# Patient Record
Sex: FEMALE | Race: WHITE
[De-identification: names, ages, dates, MRNs, and addresses within clinical notes are randomized per-mention and may not be internally consistent; named-entity substitution may affect disease eponyms.]

---

## 2018-01-12 ENCOUNTER — HOSPITAL ENCOUNTER (EMERGENCY)
Dept: HOSPITAL 89 - ER | Age: 28
Discharge: HOME | End: 2018-01-12
Payer: MEDICAID

## 2018-01-12 VITALS — DIASTOLIC BLOOD PRESSURE: 82 MMHG | SYSTOLIC BLOOD PRESSURE: 123 MMHG

## 2018-01-12 DIAGNOSIS — S63.502A: Primary | ICD-10-CM

## 2018-01-12 PROCEDURE — 99283 EMERGENCY DEPT VISIT LOW MDM: CPT

## 2018-01-12 NOTE — RADIOLOGY IMAGING REPORT
FACILITY: [facility] 

 

PATIENT NAME: Amber Tran

: 1990

MR: [id number]

V: [id number]

EXAM DATE: 

ORDERING PHYSICIAN: TABITHA ENCISO

TECHNOLOGIST: 

 

Location: [facility]

Patient: Amber Tran

: 1990

MRN: [id number]

Visit/Account:[id number]

Date of Sevice:  2018

 

ACCESSION #: [id number]

 

EXAMINATION:  Left wrist series, 3 views  2018 10:25 PM

 

HISTORY: Fall. Lateral pain in left wrist.

 

COMPARISON:  None

 

FINDINGS:  Visualized bony structures are intact and anatomically aligned without fracture or other a
cute osseous abnormality evident. Small chronic ossification center or sesamoid dorsally over the car
pometacarpal articulation on the lateral.

 

IMPRESSION:

No acute bony injury.

 

Report Dictated By: Ab Sanchez MD at 2018 10:46 PM

 

Report E-Signed By: Ab Sanchez MD  at 2018 10:48 PM

 

WSN:[id number]

## 2018-06-03 ENCOUNTER — HOSPITAL ENCOUNTER (EMERGENCY)
Dept: HOSPITAL 89 - ER | Age: 28
Discharge: HOME | End: 2018-06-03
Payer: MEDICAID

## 2018-06-03 VITALS — DIASTOLIC BLOOD PRESSURE: 92 MMHG | SYSTOLIC BLOOD PRESSURE: 114 MMHG

## 2018-06-03 DIAGNOSIS — S90.32XA: Primary | ICD-10-CM

## 2018-06-03 PROCEDURE — 99283 EMERGENCY DEPT VISIT LOW MDM: CPT

## 2018-06-03 PROCEDURE — 99282 EMERGENCY DEPT VISIT SF MDM: CPT

## 2018-06-03 NOTE — ER REPORT
History and Physical


Time Seen By MD:  19:36


Hx. of Stated Complaint:  


WATER TROUGH DROPPED ON LEFT FOOT APPROX 150#


HPI/ROS


CHIEF COMPLAINT: Dropped trough on foot





HISTORY OF PRESENT ILLNESS: 27-year-old female patient presents to emergency 

room with complaint of left foot pain. Patient states that she was cleaning out 

the water trough for horse and while she was doing that she did not notice that 

it was caught on the fence. As she is putting water into it a became 

disconnected from the fence and fell onto her foot. Patient since then has been 

having significant amounts of pain to the top side of her foot. She states she 

does have some numbness and tingling to her toes. Patient denies any difficulty 

moving her toes. She states she has been able to walk on it although she does 

have discomfort with that. Patient has not taken any medication for this.


Allergies:  


Coded Allergies:  


     latex (Verified  Allergy, Intermediate, ITCHY, 6/3/18)


     Sulfa (Sulfonamide Antibiotics) (Verified  Allergy, Mild, NAUSEA, AND 

TIGHT CHEST, 6/3/18)


     banana (Verified  Allergy, Unknown, 6/3/18)


     chocolate flavor (Verified  Allergy, Unknown, 6/3/18)


     coffee (Coffea arabica) (Verified  Allergy, Unknown, 6/3/18)


     morphine (Verified  Allergy, Unknown, 6/3/18)


     oxycodone (Verified  Adverse Reaction, Mild, Vomiting , 6/3/18)


Uncoded Allergies:  


     spiders (Allergy, Severe, tachycardia/chest tightness/low blood pressure, 2 /12/17)


Home Meds


Reported Medications


Trazodone Hcl (TRAZODONE HCL) 100 Mg Tablet, 100 MG PO QHS, TAB


   11/23/17


Sertraline Hcl (SERTRALINE HCL) 100 Mg Tablet, 1 TAB PO QDAY, TAB


   11/23/17


Discontinued Reported Medications


Multivitamin (ONE DAILY) 1 Each Tablet, 1 EACH PO DAILY


   10/16/15


Discontinued Scripts


Epinephrine (EPIPEN 2-CRIS) 0.3 Mg/0.3 Ml Pen.injctr, 0.3 MG IM ONCE for 

allergic reaction, #1 PACK 0 Refills


   Prov:LISA YATES MD         11/30/16


Past Medical/Surgical History


Patient has a past medical history of tachycardia, panic attacks, hernia, 

kidney stones, frequent UTIs, ovarian cyst, polycystic ovarian syndrome, back 

pain, PTSD, postpartum depression.


Patient has a surgical history of neck surgery.


Patient has a family medical history of stroke, diabetes, mental disability.


Reviewed Nurses Notes:  Yes


Hx Smoking:  Yes (1/4PPD )


Smoking Status:  Current: Some Days Smoker


Exposure to Second Hand Smoke?:  Yes (1/2 pack a day)


Hx Substance Use Disorder:  No


Hx Alcohol Use:  No


Constitutional





Vital Sign - Last 24 Hours








 6/3/18 6/3/18





 19:27 20:58


 


Temp 98.7 


 


Pulse 91 80


 


Resp 18 16


 


B/P (MAP) 120/82 114/92 (99)


 


Pulse Ox 95 90


 


O2 Delivery Room Air 








Physical Exam


   General appearance: Alert no distress.


Respiratory: Chest is non tender, lungs are clear to auscultation.


Cardiac: Regular rate and rhythm.


Musculoskeletal: Patient has tenderness to the top of the left foot, there is 

no bruising noted, patient is able wiggle her toes without any difficulties.





DIFFERENTIAL DIAGNOSIS: After history and physical exam differential diagnosis 

was considered for contusion, fracture, crush injury.





Medical Decision Making


EKG/Imaging


Imaging


INDICATION:  Left foot injury.


 


EXAM DATE:  6/3/2018 8:04 PM


 


COMPARISON: None.


 


FINDINGS:


3 views left foot. Mineralization is normal. No acute alignment abnormality or 

fracture. Soft tissues are unremarkable.


 


IMPRESSION:  Normal left foot.


 


Report Dictated By: Stalin He MD at 6/3/2018 8:16 PM


 


Report E-Signed By: Stalin He MD  at 6/3/2018 8:18 PM





ED Course/Re-evaluation


ED Course


Patient was admitted to exam room, history and physical were obtained. 

Differential diagnoses were considered. On examination patient has some 

tenderness to the top of the left foot, there is some swelling but no bruising. 

X-rays done of the left foot which was negative for fracture. I discussed 

findings with the patient. We will go ahead and discharge patient this time. 

She is to limit her activity by pain, she is to ice her foot to 3 times a day. 

She is return to emergency room if condition worsens. Patient verbalized 

understanding and agreement with plan.


Decision to Disposition Date:  Em[place] 3, 2018


Decision to Disposition Time:  20:39





Depart


Departure


Latest Vital Signs





Vital Signs








  Date Time  Temp Pulse Resp B/P (MAP) Pulse Ox O2 Delivery O2 Flow Rate FiO2


 


6/3/18 20:58  80 16 114/92 (99) 90   


 


6/3/18 19:27 98.7     Room Air  








Impression:  


 Primary Impression:  


 Foot contusion


Condition:  Improved


Disposition:  HOME OR SELF-CARE


Patient Instructions:  Contusion in Adults (ED)





Additional Instructions:  


Limit activity by pain.


Ice the foot 2-3 times a day for 10-15 minutes.


Get plenty of rest.


Take Tylenol or Ibuprofen as needed for pain.


Follow up with your primary care primary care provider in the next week.


Return to the ER if condition worsens.





Problem Qualifiers








 Primary Impression:  


 Foot contusion


 Encounter type:  initial encounter  Laterality:  left  Qualified Codes:  

S90.32XA - Contusion of left foot, initial encounter








CHUCK COHEN [place] 3, 2018 20:06

## 2018-06-03 NOTE — ER REPORT
History and Physical


Time Seen By MD:  19:27


Allergies:  


Coded Allergies:  


     latex (Verified  Allergy, Intermediate, ITCHY, 6/3/18)


     Sulfa (Sulfonamide Antibiotics) (Verified  Allergy, Mild, NAUSEA, AND 

TIGHT CHEST, 6/3/18)


     banana (Verified  Allergy, Unknown, 6/3/18)


     chocolate flavor (Verified  Allergy, Unknown, 6/3/18)


     coffee (Coffea arabica) (Verified  Allergy, Unknown, 6/3/18)


     morphine (Verified  Allergy, Unknown, 6/3/18)


     oxycodone (Verified  Adverse Reaction, Mild, Vomiting , 6/3/18)


Uncoded Allergies:  


     spiders (Allergy, Severe, tachycardia/chest tightness/low blood pressure, 2 /12/17)


Home Meds


Reported Medications


Trazodone Hcl (TRAZODONE HCL) 100 Mg Tablet, 100 MG PO QHS, TAB


   11/23/17


Sertraline Hcl (SERTRALINE HCL) 100 Mg Tablet, 1 TAB PO QDAY, TAB


   11/23/17


Discontinued Reported Medications


Multivitamin (ONE DAILY) 1 Each Tablet, 1 EACH PO DAILY


   10/16/15


Discontinued Scripts


Epinephrine (EPIPEN 2-CRIS) 0.3 Mg/0.3 Ml Pen.injctr, 0.3 MG IM ONCE for 

allergic reaction, #1 PACK 0 Refills


   Prov:LISA AYTES MD         11/30/16


Reviewed Nurses Notes:  Yes


Old Medical Records Reviewed:  Yes


Hx Smoking:  Yes (1/4PPD )


Smoking Status:  Current: Some Days Smoker


Exposure to Second Hand Smoke?:  Yes (1/2 pack a day)


Hx Substance Use Disorder:  No


Hx Alcohol Use:  No


Constitutional





Vital Sign - Last 24 Hours








 6/3/18





 19:27


 


Temp 98.7


 


Pulse 91


 


Resp 18


 


B/P (MAP) 120/82


 


Pulse Ox 95


 


O2 Delivery Room Air











Depart


Departure


Latest Vital Signs





Vital Signs








  Date Time  Temp Pulse Resp B/P (MAP) Pulse Ox O2 Delivery O2 Flow Rate FiO2


 


6/3/18 19:27 98.7 91 18 120/82 95 Room Air  

















TABITHA ENCISO DO [place] 3, 2018 19:27

## 2018-06-03 NOTE — RADIOLOGY IMAGING REPORT
[place] [facility] [place] 

 

PATIENT NAME: Amber Tran

: 1990

MR: [id number]

V: [id number]

EXAM DATE: 

ORDERING PHYSICIAN: CHUCK COHEN

TECHNOLOGIST: 

 

Location: [facility]

Patient: Amber Tran

: 1990

MRN: [id number]

Visit/Account:[id number]

Date of Sevice:  2018

 

ACCESSION #: [id number]

 

INDICATION:  Left foot injury.

 

EXAM DATE:  6/3/2018 8:04 PM

 

COMPARISON: None.

 

FINDINGS:

3 views left foot. Mineralization is normal. No acute alignment abnormality or fracture. Soft tissues
 are unremarkable.

 

IMPRESSION:  Normal left foot.

 

Report Dictated By: Stalin He MD at 6/3/2018 8:16 PM

 

Report E-Signed By: Stalin He MD  at 6/3/2018 8:18 PM

 

WSN:[id number]

## 2018-07-19 ENCOUNTER — HOSPITAL ENCOUNTER (EMERGENCY)
Dept: HOSPITAL 89 - ER | Age: 28
Discharge: HOME | End: 2018-07-19
Payer: MEDICAID

## 2018-07-19 VITALS — SYSTOLIC BLOOD PRESSURE: 101 MMHG | DIASTOLIC BLOOD PRESSURE: 58 MMHG

## 2018-07-19 DIAGNOSIS — N10: Primary | ICD-10-CM

## 2018-07-19 LAB — PLATELET COUNT, AUTOMATED: 195 K/UL (ref 150–450)

## 2018-07-19 PROCEDURE — 74177 CT ABD & PELVIS W/CONTRAST: CPT

## 2018-07-19 PROCEDURE — 82247 BILIRUBIN TOTAL: CPT

## 2018-07-19 PROCEDURE — 82374 ASSAY BLOOD CARBON DIOXIDE: CPT

## 2018-07-19 PROCEDURE — 84520 ASSAY OF UREA NITROGEN: CPT

## 2018-07-19 PROCEDURE — 96374 THER/PROPH/DIAG INJ IV PUSH: CPT

## 2018-07-19 PROCEDURE — 82310 ASSAY OF CALCIUM: CPT

## 2018-07-19 PROCEDURE — 99284 EMERGENCY DEPT VISIT MOD MDM: CPT

## 2018-07-19 PROCEDURE — 82040 ASSAY OF SERUM ALBUMIN: CPT

## 2018-07-19 PROCEDURE — 84155 ASSAY OF PROTEIN SERUM: CPT

## 2018-07-19 PROCEDURE — 82435 ASSAY OF BLOOD CHLORIDE: CPT

## 2018-07-19 PROCEDURE — 83690 ASSAY OF LIPASE: CPT

## 2018-07-19 PROCEDURE — 84460 ALANINE AMINO (ALT) (SGPT): CPT

## 2018-07-19 PROCEDURE — 84295 ASSAY OF SERUM SODIUM: CPT

## 2018-07-19 PROCEDURE — 84450 TRANSFERASE (AST) (SGOT): CPT

## 2018-07-19 PROCEDURE — 85025 COMPLETE CBC W/AUTO DIFF WBC: CPT

## 2018-07-19 PROCEDURE — 82947 ASSAY GLUCOSE BLOOD QUANT: CPT

## 2018-07-19 PROCEDURE — 96361 HYDRATE IV INFUSION ADD-ON: CPT

## 2018-07-19 PROCEDURE — 84075 ASSAY ALKALINE PHOSPHATASE: CPT

## 2018-07-19 PROCEDURE — 96375 TX/PRO/DX INJ NEW DRUG ADDON: CPT

## 2018-07-19 PROCEDURE — 84132 ASSAY OF SERUM POTASSIUM: CPT

## 2018-07-19 PROCEDURE — 84703 CHORIONIC GONADOTROPIN ASSAY: CPT

## 2018-07-19 PROCEDURE — 82565 ASSAY OF CREATININE: CPT

## 2018-07-19 PROCEDURE — 81001 URINALYSIS AUTO W/SCOPE: CPT

## 2018-07-19 NOTE — ER REPORT
History and Physical


Time Seen By MD:  08:00


HPI/ROS


CHIEF COMPLAINT: Right lower quadrant abdominal pain





HISTORY OF PRESENT ILLNESS: Patient is a 27-year-old female who presents to the 

emergency department with complaint of right lower quadrant abdominal pain that 

is severe in nature. Movement seems to make the pain worse. She states that the 

symptoms began 3 AM yesterday. She apparently was seen by her primary care 

provider yesterday. She states that the provider was not concerned somewhat 

about abdominal pain however stated that her lungs sounded "bad". She reports 

that the chest x-ray was done at that time but sates that it was "negative". 

She was started on a course of dicloxacillin for presumed pneumonia. Patient 

states she's been having fevers and chills. She states that the pain has 

persisted which is why she presents to the emergency department at this time. 

She denies any dysuria. She denies any vaginal discharge. She does state that 

she's had a history of ovarian cysts in the past.





REVIEW OF SYSTEMS:


Constitutional: Fevers with chills


Eyes: No discharge.


ENT: No sore throat. 


Cardiovascular: No chest pain, no palpitations.


Respiratory: No cough, no shortness of breath.


Gastrointestinal: Right lower quadrant abdominal pain, nausea


Genitourinary: No hematuria.


Musculoskeletal: No back pain.


Skin: No rashes.


Neurological: No headache.


Allergies:  


Coded Allergies:  


     latex (Verified  Allergy, Intermediate, ITCHY, 18)


     Sulfa (Sulfonamide Antibiotics) (Verified  Allergy, Mild, NAUSEA, AND 

TIGHT CHEST, 18)


     banana (Verified  Allergy, Unknown, 18)


     chocolate flavor (Verified  Allergy, Unknown, 18)


     coffee (Coffea arabica) (Verified  Allergy, Unknown, 18)


     morphine (Verified  Allergy, Unknown, 18)


     oxycodone (Verified  Adverse Reaction, Mild, Vomiting , 18)


Uncoded Allergies:  


     spiders (Allergy, Severe, tachycardia/chest tightness/low blood pressure, )


Home Meds


Active Scripts


Hydrocodone Bit/Acetaminophen (HYDROCODON-ACETAMINOPHEN 5-325) 1 Each Tablet, 1 

EACH PO Q6H for PAIN, #15 TAB 0 Refills


   Prov:LISA YATES MD         18


Cephalexin 500 Mg Tab (KEFLEX 500 MG TAB) 500 Mg Tablet, 500 MG PO Q6H, #28 TAB 

0 Refills


   TAKE ONE TABLET BY MOUTH EVERY SIX HOURS


   Prov:LISA YATES MD         18


Ondansetron Hcl (ZOFRAN) 4 Mg Tablet, 4 MG PO Q8H for Nausea, #15 TAB 0 Refills


   Prov:LISA YATES MD         18


Reported Medications


[implanon]   No Conflict Check


   18


Dicloxacillin Sodium (DICLOXACILLIN SODIUM) 250 Mg Capsule, 250 MG PO, CAPSULE


   18


Trazodone Hcl (TRAZODONE HCL) 100 Mg Tablet, 100 MG PO QHS, TAB


   17


Sertraline Hcl (SERTRALINE HCL) 100 Mg Tablet, 1 TAB PO QDAY, TAB


   17


Discontinued Scripts


Oxycodone Hcl/Acetaminophen (PERCOCET 5-325 MG TABLET) 1 Each Tablet, 1 EACH PO 

Q4-6H for PAIN, #15 TAB 0 Refills


   Prov:LISA YATES MD         18


Past Medical/Surgical History


Patient has a past medical history of tachycardia, panic attacks, hernia, 

kidney stones, frequent UTIs, ovarian cyst, polycystic ovarian syndrome, back 

pain, PTSD, postpartum depression.


Patient has a surgical history of neck surgery.


Hx Smoking:  Yes (1/4PPD )


Smoking Status:  Current: Some Days Smoker


Exposure to Second Hand Smoke?:  Yes (1/2 pack a day)


Hx Substance Use Disorder:  No


Hx Alcohol Use:  No


Constitutional





Vital Sign - Last 24 Hours








 18





 07:57 08:00 08:01 08:15


 


Temp  98.3  


 


Pulse ??? 112  


 


Resp  22  


 


B/P (MAP)  128/76 128/76 (93) 130/86 (101)


 


Pulse Ox  92  


 


O2 Delivery  Room Air  


 


    





 18





 08:27 08:30 08:45 09:15


 


Pulse 110   


 


B/P (MAP)  132/81 (98) 120/85 (97) 128/74 (92)


 


Pulse Ox 94   





 18  





 09:30 09:45  


 


B/P (MAP) 128/74 (92) 101/58 (72)  














Intake and Output   


 


 18





 14:59 22:59 06:59


 


Intake Total 1000 ml  


 


Balance 1000 ml  








Physical Exam


General/Constitutional: Patient is awake, alert, nontoxic and in no acute 

respiratory distress. 


Head: Normocephalic and atraumatic.


Eyes: Conjunctival clear, Pupils are equal and reactive to light. Extraocular 

muscles are intact and symmetrical. Sclera are clear and anicteric.


Ears:External canals are clear. Tympanic membranes are clear with normal 

landmarks and light reflex.


Nares: No rhinorrhea or bleeding. Turbinates are pink and moist.


Oropharyngeal: Mucous membranes are moist. There is no pharyngeal erythema or 

exudate. There are no palatal petechiae. Uvula is midline and symmetrical. 


Neck: Supple, no adenopathy.


Cardiovascular: Heart is regular and slightly tachycardic without murmurs


Pulmonary: Lungs are clear to auscultation bilaterally. There are no wheezes, 

rales, or rhonchi. Chest rise is symmetrical


Abdomen: Right lower quadrant abdominal pain with voluntary guarding equivocal 

rebound tenderness.


Extremities: No gross deformities, No peripheral cyanosis. Able to move all 4 

extremities.


Neuro: Alert and oriented X3,


Skin: No rashes, skin is warm dry and well perfused.





Medical Decision Making


Data Points


Result Diagram:  


18 0806                                                                   

             18 0806





Laboratory





Hematology








Test


  18


08:00 18


08:06


 


Urine Color Yellow  


 


Urine Clarity


  Slightly-cloudy


  


 


 


Urine pH


  5.0 pH


(4.8-9.5) 


 


 


Urine Specific Gravity 1.016  


 


Urine Protein


  30 mg/dL


(NEGATIVE) 


 


 


Urine Glucose (UA)


  Negative mg/dL


(NEGATIVE) 


 


 


Urine Ketones


  Negative mg/dL


(NEGATIVE) 


 


 


Urine Blood


  Moderate


(NEGATIVE) 


 


 


Urine Nitrite


  Negative


(NEGATIVE) 


 


 


Urine Bilirubin


  Negative


(NEGATIVE) 


 


 


Urine Urobilinogen


  Negative mg/dL


(0.2-1.9) 


 


 


Urine Leukocyte Esterase


  Small


(NEGATIVE) 


 


 


Urine RBC


  63 /HPF


(0-2/HPF) 


 


 


Urine WBC


  71 /HPF


(0-5/HPF) 


 


 


Urine Squamous Epithelial


Cells Many /LPF


(</=FEW) 


 


 


Urine Bacteria


  Few /HPF


(NONE-FEW) 


 


 


Urine Mucus


  Few /HPF


(NONE-FEW) 


 


 


Red Blood Count


  


  4.79 M/uL


(4.17-5.56)


 


Mean Corpuscular Volume


  


  87.3 fL


(80.0-96.0)


 


Mean Corpuscular Hemoglobin


  


  30.8 pg


(26.0-33.0)


 


Mean Corpuscular Hemoglobin


Concent 


  35.2 g/dL


(32.0-36.0)


 


Red Cell Distribution Width


  


  13.5 %


(11.5-14.5)


 


Mean Platelet Volume


  


  8.1 fL


(7.2-11.1)


 


Neutrophils (%) (Auto)


  


  79.1 %


(39.4-72.5)


 


Lymphocytes (%) (Auto)


  


  11.5 %


(17.6-49.6)


 


Monocytes (%) (Auto)


  


  8.6 %


(4.1-12.4)


 


Eosinophils (%) (Auto)


  


  0.3 %


(0.4-6.7)


 


Basophils (%) (Auto)


  


  0.5 %


(0.3-1.4)


 


Nucleated RBC Relative Count


(auto) 


  0.0 /100WBC 


 


 


Neutrophils # (Auto)


  


  9.6 K/uL


(2.0-7.4)


 


Lymphocytes # (Auto)


  


  1.4 K/uL


(1.3-3.6)


 


Monocytes # (Auto)


  


  1.0 K/uL


(0.3-1.0)


 


Eosinophils # (Auto)


  


  0.0 K/uL


(0.0-0.5)


 


Basophils # (Auto)


  


  0.1 K/uL


(0.0-0.1)


 


Nucleated RBC Absolute Count


(auto) 


  0.00 K/uL 


 


 


Sodium Level


  


  138 mmol/L


(137-145)


 


Potassium Level


  


  3.5 mmol/L


(3.5-5.0)


 


Chloride Level


  


  105 mmol/L


()


 


Carbon Dioxide Level


  


  22 mmol/L


(22-31)


 


Blood Urea Nitrogen  7 mg/dl (7-18) 


 


Creatinine


  


  0.70 mg/dl


(0.52-1.04)


 


Glomerular Filtration Rate


Calc 


  > 60.0 


 


 


Random Glucose


  


  130 mg/dl


()


 


Calcium Level


  


  8.8 mg/dl


(8.4-10.2)


 


Total Bilirubin


  


  0.6 mg/dl


(0.2-1.3)


 


Aspartate Amino Transf


(AST/SGOT) 


  17 U/L (0-35) 


 


 


Alanine Aminotransferase


(ALT/SGPT) 


  19 U/L (0-56) 


 


 


Alkaline Phosphatase  61 U/L (0-126) 


 


Total Protein


  


  6.6 g/dl


(6.3-8.2)


 


Albumin


  


  3.9 g/dl


(3.5-5.0)


 


Lipase


  


  43 U/L


()


 


Human Chorionic Gonadotropin,


Qual 


  Negative


(NEGATIVE)








Chemistry








Test


  18


08:00 18


08:06


 


Urine Color Yellow  


 


Urine Clarity


  Slightly-cloudy


  


 


 


Urine pH


  5.0 pH


(4.8-9.5) 


 


 


Urine Specific Gravity 1.016  


 


Urine Protein


  30 mg/dL


(NEGATIVE) 


 


 


Urine Glucose (UA)


  Negative mg/dL


(NEGATIVE) 


 


 


Urine Ketones


  Negative mg/dL


(NEGATIVE) 


 


 


Urine Blood


  Moderate


(NEGATIVE) 


 


 


Urine Nitrite


  Negative


(NEGATIVE) 


 


 


Urine Bilirubin


  Negative


(NEGATIVE) 


 


 


Urine Urobilinogen


  Negative mg/dL


(0.2-1.9) 


 


 


Urine Leukocyte Esterase


  Small


(NEGATIVE) 


 


 


Urine RBC


  63 /HPF


(0-2/HPF) 


 


 


Urine WBC


  71 /HPF


(0-5/HPF) 


 


 


Urine Squamous Epithelial


Cells Many /LPF


(</=FEW) 


 


 


Urine Bacteria


  Few /HPF


(NONE-FEW) 


 


 


Urine Mucus


  Few /HPF


(NONE-FEW) 


 


 


White Blood Count


  


  12.1 k/uL


(4.5-11.0)


 


Red Blood Count


  


  4.79 M/uL


(4.17-5.56)


 


Hemoglobin


  


  14.8 g/dL


(12.0-16.0)


 


Hematocrit


  


  41.9 %


(34.0-47.0)


 


Mean Corpuscular Volume


  


  87.3 fL


(80.0-96.0)


 


Mean Corpuscular Hemoglobin


  


  30.8 pg


(26.0-33.0)


 


Mean Corpuscular Hemoglobin


Concent 


  35.2 g/dL


(32.0-36.0)


 


Red Cell Distribution Width


  


  13.5 %


(11.5-14.5)


 


Platelet Count


  


  195 K/uL


(150-450)


 


Mean Platelet Volume


  


  8.1 fL


(7.2-11.1)


 


Neutrophils (%) (Auto)


  


  79.1 %


(39.4-72.5)


 


Lymphocytes (%) (Auto)


  


  11.5 %


(17.6-49.6)


 


Monocytes (%) (Auto)


  


  8.6 %


(4.1-12.4)


 


Eosinophils (%) (Auto)


  


  0.3 %


(0.4-6.7)


 


Basophils (%) (Auto)


  


  0.5 %


(0.3-1.4)


 


Nucleated RBC Relative Count


(auto) 


  0.0 /100WBC 


 


 


Neutrophils # (Auto)


  


  9.6 K/uL


(2.0-7.4)


 


Lymphocytes # (Auto)


  


  1.4 K/uL


(1.3-3.6)


 


Monocytes # (Auto)


  


  1.0 K/uL


(0.3-1.0)


 


Eosinophils # (Auto)


  


  0.0 K/uL


(0.0-0.5)


 


Basophils # (Auto)


  


  0.1 K/uL


(0.0-0.1)


 


Nucleated RBC Absolute Count


(auto) 


  0.00 K/uL 


 


 


Glomerular Filtration Rate


Calc 


  > 60.0 


 


 


Calcium Level


  


  8.8 mg/dl


(8.4-10.2)


 


Total Bilirubin


  


  0.6 mg/dl


(0.2-1.3)


 


Aspartate Amino Transf


(AST/SGOT) 


  17 U/L (0-35) 


 


 


Alanine Aminotransferase


(ALT/SGPT) 


  19 U/L (0-56) 


 


 


Alkaline Phosphatase  61 U/L (0-126) 


 


Total Protein


  


  6.6 g/dl


(6.3-8.2)


 


Albumin


  


  3.9 g/dl


(3.5-5.0)


 


Lipase


  


  43 U/L


()


 


Human Chorionic Gonadotropin,


Qual 


  Negative


(NEGATIVE)








Urinalysis








Test


  18


08:00


 


Urine Color Yellow 


 


Urine Clarity


  Slightly-cloudy


 


 


Urine pH


  5.0 pH


(4.8-9.5)


 


Urine Specific Gravity 1.016 


 


Urine Protein


  30 mg/dL


(NEGATIVE)


 


Urine Glucose (UA)


  Negative mg/dL


(NEGATIVE)


 


Urine Ketones


  Negative mg/dL


(NEGATIVE)


 


Urine Blood


  Moderate


(NEGATIVE)


 


Urine Nitrite


  Negative


(NEGATIVE)


 


Urine Bilirubin


  Negative


(NEGATIVE)


 


Urine Urobilinogen


  Negative mg/dL


(0.2-1.9)


 


Urine Leukocyte Esterase


  Small


(NEGATIVE)


 


Urine RBC


  63 /HPF


(0-2/HPF)


 


Urine WBC


  71 /HPF


(0-5/HPF)


 


Urine Squamous Epithelial


Cells Many /LPF


(</=FEW)


 


Urine Bacteria


  Few /HPF


(NONE-FEW)


 


Urine Mucus


  Few /HPF


(NONE-FEW)











EKG/Imaging


Imaging


FACILITY: [facility] [place] 


 


PATIENT NAME: Amber Tran


: 1990


MR: [id number]


V: [id number]


EXAM DATE: 


ORDERING PHYSICIAN: LISA YATES


TECHNOLOGIST: 


 


Location: [facility] [place]


Patient: Amber Tran


: 1990


MRN: [id number]


Visit/Account:[id number]


Date of Sevice:  2018


 


ACCESSION #: [id number]


 


ABDOMEN/PELVIS WITH CONTRAST


 


HISTORY:  Right lower quadrant pain.


 


TECHNIQUE:  CT abdomen and pelvis with intravenous contrast.


 


One of the following dose optimization techniques was utilized in the 

performance of this exam: Automated exposure control; adjustment of the mA and/

or kV according to the patient's size; or use of an iterative  reconstruction 

technique.  Specific details can be referenced in the facility's radiology CT 

exam operational policy.


 


CONTRAST:  75 mL Isovue-370 IV


 


COMPARISON:  None.


 


FINDINGS:


 


Visualized lung bases:  Negative.


 


Hepatobiliary:  Punctate calcification left hepatic lobe consistent with benign 

granuloma.


 


Spleen:  Negative.


 


Adrenals:  Negative.


 


Pancreas:  Negative.


 


Kidneys/:  Left kidney unremarkable. Patchy ill-defined areas of cortical 

hypoenhancement throughout the right kidney with mild perinephric fat 

stranding. No gross organized cortical fluid collection to suggest abscess at 

this time. Mild asymmetric urothelial thickening on the right without 

hydronephrosis. No urinary collecting system stone. 3.5 cm simple fluid 

attenuating right ovarian cyst.


 


GI:  No obstruction, wall thickening or surrounding inflammation. Appendix well-

visualized and appears normal. Mild distal colonic constipation.


 


Vessels/spaces/nodes:  Trace free pelvic fluid.


 


Bones/soft tissues:  Unremarkable.


 


IMPRESSION:


 


1. Asymmetric right perinephric fat stranding, patchy ill-defined foci of renal 

cortical hypoenhancement and urothelial thickening most consistent with urinary 

tract infection/pyelonephritis.


2. 3.5 cm simple fluid attenuating right ovarian cyst.


 


Report Dictated By: Joel Palumbo MD at 2018 9:17 AM


 


Report E-Signed By: Joel Palumbo MD  at 2018 9:23 AM


 


WSN:[id number]





ED Course/Re-evaluation


Clinical Indication for ER IV:  Hydration, IV Access


ED Course


 2018 8:17:47 am patient with right lower quadrant abdominal pain. After 

history and physical exam was performed differential diagnosis was formulated 

which includes but is not limited to: Appendicitis, gastroenteritis, ovarian 

cysts, ovarian torsion, mesenteric adenitis. Plan at this time will be IV 

hydration along with pain relief with IV Toradol, nausea relief with IV Reglan 

and Benadryl. I will check CBC CMP and CT scan of the abdomen and pelvis. We'll 

also perform a urinalysis. We'll check pregnancy test.


Re-evaluation


 2018 9:23:38 am pain is significantly improved with Toradol, Reglan and 

Benadryl. Awaiting results of CT scan.





 2018 10:16:59 am CT consistent with acute pyelonephritis. Plan will be 

IV Rocephin we'll then send the patient home on oral Keflex and have her 

discontinue the dicloxacillin. I initially called and oxycodone however patient 

has an allergy for that medication we will switch prescription for hydrocodone


Decision to Disposition Date:  2018


Decision to Disposition Time:  10:17





Depart


Departure


Latest Vital Signs





Vital Signs








  Date Time  Temp Pulse Resp B/P (MAP) Pulse Ox O2 Delivery O2 Flow Rate FiO2


 


18 09:45    101/58 (72)    


 


18 08:27  110   94   


 


18 08:00 98.3  22   Room Air  








Impression:  


 Primary Impression:  


 Pyelonephritis


Condition:  Improved


New Scripts


Hydrocodone Bit/Acetaminophen (HYDROCODON-ACETAMINOPHEN 5-325) 1 Each Tablet


1 EACH PO Q6H for PAIN, #15 TAB 0 Refills


   Prov: LISA YATES MD         18 


Cephalexin 500 Mg Tab (KEFLEX 500 MG TAB) 500 Mg Tablet


500 MG PO Q6H, #28 TAB 0 Refills


   TAKE ONE TABLET BY MOUTH EVERY SIX HOURS


   Prov: LISA YATES MD         18 


Ondansetron Hcl (ZOFRAN) 4 Mg Tablet


4 MG PO Q8H for Nausea, #15 TAB 0 Refills


   Prov: LISA YATES MD         18


Patient Instructions:  Urinary Tract Infection in Women (ED)





Additional Instructions:  


Discontinue dicloxacillin, begin new course of antibiotic today and take as 

directed until complete. If your symptoms worsen at any time he should return 

to the emergency department for reevaluation. If your symptoms persist greater 

than 4872 hours he should be reevaluated.











LISA YATES MD 2018 08:00

## 2018-07-19 NOTE — RADIOLOGY IMAGING REPORT
FACILITY: [facility] 

 

PATIENT NAME: Amber Tran

: 1990

MR: [id number]

V: [id number]

EXAM DATE: 

ORDERING PHYSICIAN: LISA YATES

TECHNOLOGIST: 

 

Location: [facility] [place]

Patient: Amber Tran

: 1990

MRN: [id number]

Visit/Account:[id number]

Date of Sevice:  2018

 

ACCESSION #: [id number]

 

ABDOMEN/PELVIS WITH CONTRAST

 

HISTORY:  Right lower quadrant pain.

 

TECHNIQUE:  CT abdomen and pelvis with intravenous contrast.

 

One of the following dose optimization techniques was utilized in the performance of this exam: Autom
ated exposure control; adjustment of the mA and/or kV according to the patient's size; or use of an i
terative  reconstruction technique.  Specific details can be referenced in the facility's radiology C
T exam operational policy.

 

CONTRAST:  75 mL Isovue-370 IV

 

COMPARISON:  None.

 

FINDINGS:

 

Visualized lung bases:  Negative.

 

Hepatobiliary:  Punctate calcification left hepatic lobe consistent with benign granuloma.

 

Spleen:  Negative.

 

Adrenals:  Negative.

 

Pancreas:  Negative.

 

Kidneys/:  Left kidney unremarkable. Patchy ill-defined areas of cortical hypoenhancement throughou
t the right kidney with mild perinephric fat stranding. No gross organized cortical fluid collection 
to suggest abscess at this time. Mild asymmetric urothelial thickening on the right without hydroneph
rosis. No urinary collecting system stone. 3.5 cm simple fluid attenuating right ovarian cyst.

 

GI:  No obstruction, wall thickening or surrounding inflammation. Appendix well-visualized and appear
s normal. Mild distal colonic constipation.

 

Vessels/spaces/nodes:  Trace free pelvic fluid.

 

Bones/soft tissues:  Unremarkable.

 

IMPRESSION:

 

1. Asymmetric right perinephric fat stranding, patchy ill-defined foci of renal cortical hypoenhancem
ent and urothelial thickening most consistent with urinary tract infection/pyelonephritis.

2. 3.5 cm simple fluid attenuating right ovarian cyst.

 

Report Dictated By: Joel Palumbo MD at 2018 9:17 AM

 

Report E-Signed By: Joel Palumbo MD  at 2018 9:23 AM

 

WSN:[id number]

## 2018-08-22 ENCOUNTER — HOSPITAL ENCOUNTER (EMERGENCY)
Dept: HOSPITAL 89 - ER | Age: 28
Discharge: HOME | End: 2018-08-22
Payer: MEDICAID

## 2018-08-22 VITALS — DIASTOLIC BLOOD PRESSURE: 82 MMHG | SYSTOLIC BLOOD PRESSURE: 125 MMHG

## 2018-08-22 DIAGNOSIS — S61.210A: Primary | ICD-10-CM

## 2018-08-22 PROCEDURE — 99283 EMERGENCY DEPT VISIT LOW MDM: CPT

## 2018-08-22 NOTE — ER REPORT
History and Physical


Time Seen By MD:  19:50


HPI/ROS


CHIEF COMPLAINT: Right index finger laceration





HISTORY OF PRESENT ILLNESS: 27-year-old female presents ambulatory the ER with a

laceration to her right index finger.  She was cutting cat stems off to place 

them in water.  She accidentally cut her right index finger adjacent to the nail

on the radial side.  There is approximately 5 mm laceration starting at the 

margin of the nail extending around to the pad of the finger.  There is a small 

nick in the nail.  Patient states her tetanus shots up-to-date.  Patient notes 

2/10 dull pain in her fingertip.  Bleeding controlled with pressure.


Allergies:  


Coded Allergies:  


     latex (Verified  Allergy, Intermediate, ITCHY, 8/22/18)


     Sulfa (Sulfonamide Antibiotics) (Verified  Allergy, Mild, NAUSEA, AND TIGHT

CHEST, 8/22/18)


     banana (Verified  Allergy, Unknown, 8/22/18)


     chocolate flavor (Verified  Allergy, Unknown, 8/22/18)


     coffee (Coffea arabica) (Verified  Allergy, Unknown, 8/22/18)


     morphine (Verified  Allergy, Unknown, 8/22/18)


     oxycodone (Verified  Adverse Reaction, Mild, Vomiting , 8/22/18)


Uncoded Allergies:  


     spiders (Allergy, Severe, tachycardia/chest tightness/low blood pressure, 

2/12/17)


Home Meds


Reported Medications


Fluticasone Prop 44 Mcg (FLOVENT HFA 44 MCG) 44 Mcg Inha, 44 MCG INH, INH


   8/22/18


Epinephrine (EPIPEN 2-CRIS) 0.3 Mg/0.3 Ml Pen.injctr, 0.3 MG IM PRN


   8/22/18


[implanon]   No Conflict Check


   7/19/18


Trazodone Hcl (TRAZODONE HCL) 100 Mg Tablet, 100 MG PO QHS, TAB


   11/23/17


Sertraline Hcl (SERTRALINE HCL) 100 Mg Tablet, 1 TAB PO QDAY, TAB


   11/23/17


Discontinued Reported Medications


Dicloxacillin Sodium (DICLOXACILLIN SODIUM) 250 Mg Capsule, 250 MG PO, CAPSULE


   7/19/18


Discontinued Scripts


Hydrocodone Bit/Acetaminophen (HYDROCODON-ACETAMINOPHEN 5-325) 1 Each Tablet, 1 

EACH PO Q6H for PAIN, #15 TAB 0 Refills


   Prov:LISA YATES MD         7/19/18


Cephalexin 500 Mg Tab (KEFLEX 500 MG TAB) 500 Mg Tablet, 500 MG PO Q6H, #28 TAB 

0 Refills


   TAKE ONE TABLET BY MOUTH EVERY SIX HOURS


   Prov:LISA YATES MD         7/19/18


Ondansetron Hcl (ZOFRAN) 4 Mg Tablet, 4 MG PO Q8H for Nausea, #15 TAB 0 Refills


   Prov:LISA YATES MD         7/19/18


Reviewed Nurses Notes:  Yes


Old Medical Records Reviewed:  Yes


Hx Smoking:  Yes (1/4PPD )


Smoking Status:  Current: Some Days Smoker


Exposure to Second Hand Smoke?:  Yes (1/2 pack a day)


Hx Substance Use Disorder:  No


Hx Alcohol Use:  No


Constitutional





Vital Sign - Last 24 Hours








 8/22/18 8/22/18





 19:51 20:27


 


Temp 98.2 


 


Pulse 96 64


 


Resp 16 18


 


B/P (MAP) 125/84 125/82 (96)


 


Pulse Ox 95 97


 


O2 Delivery Room Air Room Air








Physical Exam


   General appearance: Alert no distress.


Respiratory: Chest is non tender, lungs are clear to auscultation.


Cardiac: Regular rate and rhythm 


Extremity: Examination of the right index finger.  There is a 5 mm laceration on

 the right index finger on the radial aspect just adjacent to the nail.  It is a

 transverse laceration.  The joint and all tendons are noted to be intact.





DIFFERENTIAL DIAGNOSIS: After history and physical exam differential diagnosis 

was considered for finger laceration, nail bed laceration, foreign body, joint 

penetration, tendon injury, digital nerve injury.





Medical Decision Making


ED Course/Re-evaluation


ED Course


Patient was admitted to an examination room.  H&P was done.  The differential 

diagnoses was considered.  The wound was cleansed.  Pressure was held to control

 the bleeding.  Dermabond was applied as noted below.  The wound care was 

discussed.





Procedure:  Laceration repair.





Verbal consent was obtained from the patient.  The 5 mm laceration on the right 

index finger was anesthetized in the usual fashion. The wound was scrubbed, 

draped and explored to its base with a gloved finger.  The wound was repaired 

with Dermabond adhesive.  The wound repair was simple.  The procedure was 

performed by myself.


Decision to Disposition Date:  Aug 22, 2018


Decision to Disposition Time:  20:16





Depart


Departure


Latest Vital Signs





Vital Signs








  Date Time  Temp Pulse Resp B/P (MAP) Pulse Ox O2 Delivery O2 Flow Rate FiO2


 


8/22/18 20:27  64 18 125/82 (96) 97 Room Air  


 


8/22/18 19:51 98.2       








Impression:  


   Primary Impression:  


   Laceration of right index finger


Condition:  Improved


Disposition:  HOME OR SELF-CARE


Patient Instructions:  Skin Adhesive Care (ED)





Problem Qualifiers








   Primary Impression:  


   Laceration of right index finger


   Encounter type:  initial encounter  Damage to nail status:  without damage  

   Foreign body presence:  without foreign body  Qualified Codes:  S61.210A - 

   Laceration without foreign body of right index finger without damage to nail,

    initial encounter








TABITHA ENCISO DO              Aug 22, 2018 19:50

## 2018-11-30 ENCOUNTER — HOSPITAL ENCOUNTER (OUTPATIENT)
Dept: HOSPITAL 89 - CT | Age: 28
End: 2018-11-30
Attending: NURSE PRACTITIONER
Payer: MEDICAID
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
## 2018-11-30 DIAGNOSIS — R10.9: ICD-10-CM

## 2018-11-30 DIAGNOSIS — M54.5: ICD-10-CM

## 2018-11-30 DIAGNOSIS — M25.552: Primary | ICD-10-CM

## 2018-11-30 PROCEDURE — 74176 CT ABD & PELVIS W/O CONTRAST: CPT

## 2018-11-30 NOTE — RADIOLOGY IMAGING REPORT
FACILITY: [facility] 

 

PATIENT NAME: Amber Tran

: 1990

MR: [id number]

V: [id number]

EXAM DATE: 

ORDERING PHYSICIAN: WOLF RAYMUNDO

TECHNOLOGIST: 

 

Location: [facility]

Patient: Amber Tran

: 1990

MRN: [id number]

Visit/Account:[id number]

Date of Sevice: 2018

 

ACCESSION #: [id number]

 

EXAMINATION: CT abdomen and pelvis without IV contrast

 

HISTORY: Trauma. Back stepped on by a horse.

 

TECHNIQUE:   Axial CT images of the abdomen and pelvis were obtained without IV contrast, with corona
l and sagittal 2D reconstructed images.

One of the following dose optimization techniques was utilized in the performance of this exam: Autom
ated exposure control; adjustment of the mA and/or kV according to the patient's size; or use of an i
terative  reconstruction technique.  Specific details can be referenced in the facility's radiology C
T exam operational policy.

 

COMPARISON:   2018.

 

FINDINGS:

Evaluation of the solid and viscus parenchymal organs is limited without the benefit of IV contrast.

 

Liver:  Negative.

Gallbladder and bile ducts:  Negative.

Spleen:  Negative.

Pancreas:  Negative.

Adrenal glands:  Negative.

Kidneys:  Normal size and morphology of both kidneys. No retroperitoneal fluid or hemorrhage.

 

Bowel and peritoneum:  The small bowel and colon are normal in caliber and unremarkable by noncontras
t imaging. No free fluid or free intraperitoneal air.

Pelvic  structures:  Negative.

Lymph node assessment:  Negative.

Vessels:  Negative.

 

Musculoskeletal:   No acute osseous findings. Normal alignment along the lumbar spine. Vertebral body
 height and disc spaces are preserved. Posterior elements are intact, with normal alignment along the
 lumbar facet joints. The bony pelvis is intact.

Body wall:  Negative.

Lung bases:  Negative.

 

IMPRESSION:

1. No acute traumatic findings in the abdomen or pelvis by noncontrast CT imaging.

2. Lumbar spine is unremarkable by CT. No evidence of acute fracture or subluxation.

 

Report Dictated By: Ryder Sood MD at 2018 7:49 PM

 

Report E-Signed By: Ryder Sood MD  at 2018 7:53 PM

 

[id number]

## 2018-12-20 ENCOUNTER — HOSPITAL ENCOUNTER (OUTPATIENT)
Dept: HOSPITAL 89 - MRI | Age: 28
End: 2018-12-20
Attending: ORTHOPAEDIC SURGERY
Payer: MEDICAID

## 2018-12-20 DIAGNOSIS — M51.06: Primary | ICD-10-CM

## 2018-12-20 PROCEDURE — 70030 X-RAY EYE FOR FOREIGN BODY: CPT

## 2018-12-20 PROCEDURE — 72148 MRI LUMBAR SPINE W/O DYE: CPT

## 2018-12-20 NOTE — RADIOLOGY IMAGING REPORT
FACILITY: [facility] [place] 

 

PATIENT NAME: Amber Tran

: 1990

MR: [id number]

V: [id number]

EXAM DATE: 

ORDERING PHYSICIAN: DRAGAN HOANG

TECHNOLOGIST: 

 

Location: [facility] [place]

Patient: Amber Tran

: 1990

MRN: [id number]

Visit/Account:[id number]

Date of Sevice: 2018

 

ACCESSION #: [id number]

 

ORBITS FOREIGN BODY 1 VIEW

 

Given history:  Pre-MRI

 

Additional history:  None

 

Findings:Osseous structures normal.  Paranasal sinuses well aerated.  There are no radiopaque foreign
 body seen over the orbits.

 

Impression:  Normal study.  Patient cleared for MRI.

 

Report Dictated By: Javier Simons MD at 2018 10:18 AM

 

Report E-Signed By: Javier Simons MD  at 2018 10:19 AM

 

WSN:[id number]

## 2018-12-20 NOTE — RADIOLOGY IMAGING REPORT
FACILITY: [facility] [place] 

 

PATIENT NAME: Amber Tran

: 1990

MR: [id number]

V: [id number]

EXAM DATE: 

ORDERING PHYSICIAN: DRAGAN HOANG

TECHNOLOGIST: 

 

Location: [facility] [place]

Patient: Amber Tran

: 1990

MRN: [id number]

Visit/Account:[id number]

Date of Sevice: 2018

 

ACCESSION #: [id number]

 

L SPINE W/O CONTRAST

 

COMPARISON: None

 

Additional pertinent history: Lumbar disc disorder

 

Technique: Multiplanar multisequence lumbar spine MRI was performed without gadolinium enhancement.

 

FINDINGS:

 

Vertebral body heights and alignment: Negative.

 

Vertebral marrow signal: Negative.

 

Distal thoracic cord and conus: Negative.  The conus ends at T12-L1.

 

Surrounding soft tissues: Negative.

 

Inspection of the disc spaces reveal the following:

 

L5-S1: Posterior broad-based disc protrusion with facet hypertrophic changes.  Mild right-sided neura
l foraminal narrowing.  No significant left-sided neural foraminal narrowing.  Mild right lateral rec
ess stenosis with mild impingement upon the traversing right S1 nerve root.

 

L4-L5: Minimal circumferential disc bulging with facet hypertrophic changes.  No significant canal or
 neural foraminal narrowing.

 

L3-L4: Negative.

 

L2-L3: Negative.

 

L1-L2: Negative.

 

T12-L1: Negative.

 

 

IMPRESSION:

1.  Spondylitic change involving the lower lumbar spine.

2.  Findings contribute to mild right lateral recess stenosis and mild right-sided neural foraminal n
arrowing at L5-S1.

3.  No underlying canal stenosis.

 

Report Dictated By: Homer Fields MD at 2018 11:45 AM

 

Report E-Signed By: Homer Fields MD  at 2018 11:47 AM

 

WSN:[id number]

## 2019-03-03 ENCOUNTER — HOSPITAL ENCOUNTER (EMERGENCY)
Dept: HOSPITAL 89 - ER | Age: 29
Discharge: HOME | End: 2019-03-03
Payer: MEDICAID

## 2019-03-03 VITALS — DIASTOLIC BLOOD PRESSURE: 74 MMHG | SYSTOLIC BLOOD PRESSURE: 115 MMHG

## 2019-03-03 DIAGNOSIS — M62.830: Primary | ICD-10-CM

## 2019-03-03 PROCEDURE — 99284 EMERGENCY DEPT VISIT MOD MDM: CPT

## 2019-03-03 PROCEDURE — 84703 CHORIONIC GONADOTROPIN ASSAY: CPT

## 2019-03-03 PROCEDURE — 96374 THER/PROPH/DIAG INJ IV PUSH: CPT

## 2019-03-03 PROCEDURE — 96375 TX/PRO/DX INJ NEW DRUG ADDON: CPT

## 2019-03-03 PROCEDURE — 72072 X-RAY EXAM THORAC SPINE 3VWS: CPT

## 2019-03-03 NOTE — ER REPORT
History and Physical


Time Seen By MD:  12:51


HPI/ROS


CHIEF COMPLAINT: Left-sided thoracic back pain





HISTORY OF PRESENT ILLNESS: Patient is a 28-year-old female who presents 


emergency department after bending down to  a 10 pound pale and felt 


immediate pain to the middle of her thoracic spine on the left side. The pain do


ubled her over. It's worse with inspiration or movement. She has had no similar 


episodes like this in the past. She does have a prior history of frequent 


urinary tract infections but denies dysuria or flank pain. She denies any lower 


back pain. She denies any headache or fevers or chills.





REVIEW OF SYSTEMS:


Constitutional: No fever, no chills.


Eyes: No discharge.


ENT: No sore throat. 


Cardiovascular: No chest pain, no palpitations.


Respiratory: No cough, no shortness of breath.


Gastrointestinal: No abdominal pain, no vomiting.


Genitourinary: No hematuria.


Musculoskeletal: Upper left thoracic back pain


Skin: No rashes.


Neurological: No headache.


Allergies:  


Coded Allergies:  


     latex (Verified  Allergy, Intermediate, ITCHY, 18)


     Sulfa (Sulfonamide Antibiotics) (Verified  Allergy, Mild, NAUSEA, AND TIGHT


CHEST, 18)


     banana (Verified  Allergy, Unknown, 18)


     chocolate flavor (Verified  Allergy, Unknown, 18)


     coffee (Coffea arabica) (Verified  Allergy, Unknown, 18)


     morphine (Verified  Allergy, Unknown, 18)


     oxycodone (Verified  Adverse Reaction, Mild, Vomiting , 18)


Uncoded Allergies:  


     spiders (Allergy, Severe, tachycardia/chest tightness/low blood pressure, 


17)


Home Meds


Reported Medications


Fluticasone Prop 44 Mcg (FLOVENT HFA 44 MCG) 44 Mcg Inha, 44 MCG INH, INH


   18


Epinephrine (EPIPEN 2-CRIS) 0.3 Mg/0.3 Ml Pen.injctr, 0.3 MG IM PRN


   18


[implanon]   No Conflict Check


   18


Trazodone Hcl (TRAZODONE HCL) 100 Mg Tablet, 100 MG PO QHS, TAB


   17


Sertraline Hcl (SERTRALINE HCL) 100 Mg Tablet, 1 TAB PO QDAY, TAB


   17


Past Medical/Surgical History


Patient has a past medical history of tachycardia, panic attacks, hernia, kidney


 stones, frequent UTIs, ovarian cyst, polycystic ovarian syndrome, back pain, 


PTSD, postpartum depression.


Patient has a surgical history of neck surgery.


Hx Smoking:  Yes (14PPD )


Hx Smoking:  Yes (4PPD )


Smoking Status:  Current: Some Days Smoker


Exposure to Second Hand Smoke?:  Yes (1/2 pack a day)


Hx Substance Use Disorder:  No


Hx Alcohol Use:  No


Constitutional





Vital Sign - Last 24 Hours








 3/3/19





 12:47


 


Temp 98.0


 


Pulse 109


 


Resp 18


 


B/P (MAP) 139/90


 


Pulse Ox 90


 


O2 Delivery Room Air








Physical Exam


  General appearance: alert no distress.





Back: Thoracic spine there is some left-sided paraspinal tenderness no spinal 


tenderness


          Lumbar spine has no spinal tenderness no paraspinal tenderness 


Gastroinal: Abdomen is soft, non tender, no masses..


Skin: No lesions and no rashes.


Vascular: Normal capillary refill and pulses to feet.


Neurological:  Motor function: leg strength normal and symmetric for both legs


          Sensory function:  normal for all leg dermatomes.


          Straight leg raise negative to 70 degrees.


          Reflexes normal bilaterally on legs.





Medical Decision Making


Data Points


Laboratory





Hematology








Test


 3/3/19


13:23


 


Human Chorionic Gonadotropin,


Qual Negative


(NEGATIVE)








Chemistry








Test


 3/3/19


13:23


 


Human Chorionic Gonadotropin,


Qual Negative


(NEGATIVE)











ED Course/Re-evaluation


ED Course


FACILITY: [facility] 


 


PATIENT NAME: Amber Tran


: 1990


MR: [id number]


V: [id number]


EXAM DATE: 


ORDERING PHYSICIAN: LISA YATES


TECHNOLOGIST: 


 


Location: [facility] [place]


Patient: Amber Tran


: 1990


MRN: [id number]


Visit/Account:[id number]


Date of Sevice:  3/03/2019


 


ACCESSION #: [id number]


 


 


INDICATION:  pain; left paraspinal area.


 


DATE: 3/3/2019 2:13 PM.


 


TECHNIQUE: XR THORACIC SPINE 3 V


 


COMPARISON: Lumbar spine radiographs 2018.


 


 


FINDINGS: Vertebral body heights are maintained.  There is no conspicuous 


fracture, and there are no significant degenerative findings.  Cervical disc 


prosthesis is noted.


 


IMPRESSION:


No acute osseous abnormality and no significant degenerative findings.


 


Report Dictated By: Day Meza MD at 3/3/2019 2:13 PM


 


Report E-Signed By: Day eMza MD  at 3/3/2019 2:15 PM


 


WSN:[id number]


Decision to Disposition Date:  Mar 3, 2019


Decision to Disposition Time:  14:24





Depart


Departure


Latest Vital Signs





Vital Signs








  Date Time  Temp Pulse Resp B/P (MAP) Pulse Ox O2 Delivery O2 Flow Rate FiO2


 


3/3/19 12:47 98.0 109 18 139/90 90 Room Air  








Impression:  


   Primary Impression:  


   Spasm of thoracic back muscle


Condition:  Improved


Disposition:  HOME OR SELF-CARE


Referrals:  


LUIS PAEZ MD


make an appointment if symptoms persist greater than 7-10 days


New Scripts


Methocarbamol (ROBAXIN-750) 750 Mg Tablet


1500 MG PO QID for Muscle Relaxant, #20 TAB 0 Refills


   Prov: LISA YATES MD         3/3/19


Patient Instructions:  Back Pain (GEN)











LISA YATES MD              Mar 3, 2019 12:51

## 2019-03-03 NOTE — RADIOLOGY IMAGING REPORT
FACILITY: [facility] 

 

PATIENT NAME: Amber Tran

: 1990

MR: [id number]

V: [id number]

EXAM DATE: 

ORDERING PHYSICIAN: LISA YATES

TECHNOLOGIST: 

 

Location: [facility]

Patient: Amber Tran

: 1990

MRN: [id number]

Visit/Account:[id number]

Date of Sevice:  3/03/2019

 

ACCESSION #: [id number]

 

 

INDICATION:  pain; left paraspinal area.

 

DATE: 3/3/2019 2:13 PM.

 

TECHNIQUE: XR THORACIC SPINE 3 V

 

COMPARISON: Lumbar spine radiographs 2018.

 

 

FINDINGS: Vertebral body heights are maintained.  There is no conspicuous fracture, and there are no 
significant degenerative findings.  Cervical disc prosthesis is noted.

 

IMPRESSION:

No acute osseous abnormality and no significant degenerative findings.

 

Report Dictated By: Day Meza MD at 3/3/2019 2:13 PM

 

Report E-Signed By: Day Meza MD  at 3/3/2019 2:15 PM

 

WSN:[id number]

## 2019-04-29 ENCOUNTER — HOSPITAL ENCOUNTER (OUTPATIENT)
Dept: HOSPITAL 89 - CT | Age: 29
End: 2019-04-29
Attending: PHYSICIAN ASSISTANT
Payer: MEDICAID

## 2019-04-29 DIAGNOSIS — J32.3: ICD-10-CM

## 2019-04-29 DIAGNOSIS — J32.0: Primary | ICD-10-CM

## 2019-04-29 DIAGNOSIS — J34.2: ICD-10-CM

## 2019-04-29 PROCEDURE — 70450 CT HEAD/BRAIN W/O DYE: CPT
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
## 2019-07-15 ENCOUNTER — HOSPITAL ENCOUNTER (EMERGENCY)
Dept: HOSPITAL 89 - ER | Age: 29
Discharge: HOME | End: 2019-07-15
Payer: MEDICAID

## 2019-07-15 VITALS — SYSTOLIC BLOOD PRESSURE: 120 MMHG | DIASTOLIC BLOOD PRESSURE: 77 MMHG

## 2019-07-15 DIAGNOSIS — M25.551: ICD-10-CM

## 2019-07-15 DIAGNOSIS — R20.2: ICD-10-CM

## 2019-07-15 DIAGNOSIS — M62.830: Primary | ICD-10-CM

## 2019-07-15 DIAGNOSIS — M54.5: ICD-10-CM

## 2019-07-15 DIAGNOSIS — M79.642: ICD-10-CM

## 2019-07-15 LAB
INR PPP: 0.98
PLATELET COUNT, AUTOMATED: 225 K/UL (ref 150–450)

## 2019-07-15 PROCEDURE — 85610 PROTHROMBIN TIME: CPT

## 2019-07-15 PROCEDURE — 73502 X-RAY EXAM HIP UNI 2-3 VIEWS: CPT

## 2019-07-15 PROCEDURE — 72128 CT CHEST SPINE W/O DYE: CPT

## 2019-07-15 PROCEDURE — 84460 ALANINE AMINO (ALT) (SGPT): CPT

## 2019-07-15 PROCEDURE — 99284 EMERGENCY DEPT VISIT MOD MDM: CPT

## 2019-07-15 PROCEDURE — 73130 X-RAY EXAM OF HAND: CPT

## 2019-07-15 PROCEDURE — 84155 ASSAY OF PROTEIN SERUM: CPT

## 2019-07-15 PROCEDURE — 71045 X-RAY EXAM CHEST 1 VIEW: CPT

## 2019-07-15 PROCEDURE — 84075 ASSAY ALKALINE PHOSPHATASE: CPT

## 2019-07-15 PROCEDURE — 70450 CT HEAD/BRAIN W/O DYE: CPT

## 2019-07-15 PROCEDURE — 96374 THER/PROPH/DIAG INJ IV PUSH: CPT

## 2019-07-15 PROCEDURE — 82374 ASSAY BLOOD CARBON DIOXIDE: CPT

## 2019-07-15 PROCEDURE — 82565 ASSAY OF CREATININE: CPT

## 2019-07-15 PROCEDURE — 84703 CHORIONIC GONADOTROPIN ASSAY: CPT

## 2019-07-15 PROCEDURE — 84295 ASSAY OF SERUM SODIUM: CPT

## 2019-07-15 PROCEDURE — 81001 URINALYSIS AUTO W/SCOPE: CPT

## 2019-07-15 PROCEDURE — 85730 THROMBOPLASTIN TIME PARTIAL: CPT

## 2019-07-15 PROCEDURE — 72131 CT LUMBAR SPINE W/O DYE: CPT

## 2019-07-15 PROCEDURE — 82310 ASSAY OF CALCIUM: CPT

## 2019-07-15 PROCEDURE — 82947 ASSAY GLUCOSE BLOOD QUANT: CPT

## 2019-07-15 PROCEDURE — 82435 ASSAY OF BLOOD CHLORIDE: CPT

## 2019-07-15 PROCEDURE — 84132 ASSAY OF SERUM POTASSIUM: CPT

## 2019-07-15 PROCEDURE — 85025 COMPLETE CBC W/AUTO DIFF WBC: CPT

## 2019-07-15 PROCEDURE — 84450 TRANSFERASE (AST) (SGOT): CPT

## 2019-07-15 PROCEDURE — 72125 CT NECK SPINE W/O DYE: CPT

## 2019-07-15 PROCEDURE — 96361 HYDRATE IV INFUSION ADD-ON: CPT

## 2019-07-15 PROCEDURE — 82247 BILIRUBIN TOTAL: CPT

## 2019-07-15 PROCEDURE — 84520 ASSAY OF UREA NITROGEN: CPT

## 2019-07-15 PROCEDURE — 96375 TX/PRO/DX INJ NEW DRUG ADDON: CPT

## 2019-07-15 PROCEDURE — 82040 ASSAY OF SERUM ALBUMIN: CPT
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
## 2019-08-07 ENCOUNTER — HOSPITAL ENCOUNTER (EMERGENCY)
Dept: HOSPITAL 89 - ER | Age: 29
Discharge: HOME | End: 2019-08-07
Payer: MEDICAID

## 2019-08-07 VITALS — SYSTOLIC BLOOD PRESSURE: 133 MMHG | DIASTOLIC BLOOD PRESSURE: 79 MMHG

## 2019-08-07 DIAGNOSIS — S63.502A: Primary | ICD-10-CM

## 2019-08-07 PROCEDURE — 96372 THER/PROPH/DIAG INJ SC/IM: CPT

## 2019-08-07 PROCEDURE — 99284 EMERGENCY DEPT VISIT MOD MDM: CPT

## 2019-08-07 PROCEDURE — 73110 X-RAY EXAM OF WRIST: CPT

## 2019-08-07 PROCEDURE — 73120 X-RAY EXAM OF HAND: CPT

## 2019-08-07 NOTE — ER REPORT
History and Physical


Time Seen By MD:  15:49


HPI/ROS


CHIEF COMPLAINT: Left wrist pain





HISTORY OF PRESENT ILLNESS: Patient is a 28-year-old female here with complaints


of left wrist pain after reportedly being kicked in the hand by a horse causing 


extension of the wrist and subsequent wrist pain especially on the ventral 


aspect. Patient is neurovascularly intact at time of evaluation, capillary 


refill less than 2 seconds. Denies further injuries at this time.





REVIEW OF SYSTEMS:


Constitutional: No fever, no chills.


Musculoskeletal: Tenderness on the ventral and dorsal aspect of the wrist


Skin: No rashes.


Neurological: Neurovascularly intact


Allergies:  


Coded Allergies:  


     latex (Verified  Allergy, Intermediate, ITCHY, 19)


     nickel (Verified  Allergy, Intermediate, WELTS, 19)


     Sulfa (Sulfonamide Antibiotics) (Verified  Allergy, Mild, NAUSEA, AND TIGHT


CHEST, 19)


     banana (Verified  Allergy, Unknown, 19)


     chocolate flavor (Verified  Allergy, Unknown, 19)


     coffee (Coffea arabica) (Verified  Allergy, Unknown, 19)


     morphine (Verified  Allergy, Unknown, 19)


     oxycodone (Verified  Adverse Reaction, Mild, Vomiting , 19)


Uncoded Allergies:  


     spiders (Allergy, Severe, tachycardia/chest tightness/low blood pressure, 


17)


Home Meds


Reported Medications


Etonogestrel (NEXPLANON) 68 Mg Implant, 68 MG SQ AS DIRECTED, IMPLANT


   19


Epinephrine (EPIPEN 2-CRIS) 0.3 Mg/0.3 Ml Pen.injctr, 0.3 MG IM PRN


   18


Trazodone Hcl (TRAZODONE HCL) 100 Mg Tablet, 100 MG PO QHS, TAB


   17


Sertraline Hcl (SERTRALINE HCL) 100 Mg Tablet, 1 TAB PO QDAY, TAB


   17


Discontinued Reported Medications


Fluticasone Prop 44 Mcg (FLOVENT HFA 44 MCG) 44 Mcg Inha, 44 MCG INH, INH


   18


[implanon]   No Conflict Check


   18


Discontinued Scripts


Hydrocodone Bit/Acetaminophen (HYDROCODON-ACETAMINOPHEN 5-325) 1 Each Tablet, 1 


EACH PO Q4H PRN for PAIN, #6 TAB 0 Refills


   Prov:SONAL,GENIE W MD         7/15/19


Cyclobenzaprine Hcl (CYCLOBENZAPRINE HCL) 10 Mg Tablet, 10 MG PO Q8H PRN for 


MUSCLE SPASMS, #20 TAB 0 Refills


   Prov:GENIE PRUITT MD         7/15/19


Hx Smoking:  Yes (1/4PPD )


Smoking Status:  Current: Some Days Smoker


Exposure to Second Hand Smoke?:  Yes (1/2 pack a day)


Hx Substance Use Disorder:  No


Hx Alcohol Use:  No


Constitutional





Vital Sign - Last 24 Hours








 19





 15:55


 


Temp 97.3


 


Pulse 96


 


Resp 18


 


B/P (MAP) 133/79


 


Pulse Ox 93


 


O2 Delivery Room Air








Physical Exam


   General Appearance: The patient is alert, has no immediate need for airway 


protection and no signs of toxicity. Uncomfortable appearing


Neurological: No focal neurological deficits, intact sensorineural examination 


of the distal extremity


Skin: Warm and dry, no rashes.


Musculoskeletal:  


Tenderness on palpation of the dorsal and ventral aspect of the left wrist with 


range of motion of the wrist





DIFFERENTIAL DIAGNOSIS: After history and physical exam differential diagnosis 


was considered for fracture, contusion, abrasion, sprain





Medical Decision Making


EKG/Imaging


Imaging


PATIENT NAME: Amber Tran


: 1990


MR: [id number]


V: [id number]


EXAM DATE: 


ORDERING PHYSICIAN: HERMINIO ERAZO


TECHNOLOGIST: 


 


[place] [facility] [place] Amber Tran


: 1990


MRN: [id number]


Visit/Account:[id number]


Date of Sevice:  2019


 


ACCESSION #: [id number]


 


XR WRIST 3 OR MORE VIEWS LT, HAND LIMITED LEFT


 


COMPARISON: None.


 


HISTORY:  wrist pain , but the left wrist backwards


 


TECHNIQUE:  Two views left hand and three views left wrist


 


FINDINGS:


BONES:  No significant arthropathy, acute-appearing fracture, malalignment, or 


significant osseous lesion.  On the lateral view there is a 2 mm corticated bone


 fragment along the dorsal margin of what is probably the third CMC joint which 


has a chronic appearance.  Carpal alignment within normal limits.


 


SOFT TISSUES:  No appreciable soft tissue swelling.  No abnormal soft tissue 


calcifications.


 


OTHER:  Negative.


 


IMPRESSION:


Chronic appearing bone fragment along the dorsal margin of the third CMC joint. 


 This is probably due to remote trauma.  There is no acute fracture or 


malalignment in the left hand or wrist.


 


Report Dictated By: Marc Denney at 2019 5:03 PM


 


Report E-Signed By: Marc Denney  at 2019 5:05 PM


 


WSN:[id number]





PATIENT NAME: Amber Tran


: 1990


MR: [id number]


V: [id number]


EXAM DATE: 


ORDERING PHYSICIAN: HERMINIO ERAZO


TECHNOLOGIST: 


 


[place] [facility] [place]: Amber Tran


: 1990


MRN: [id number]


Visit/Account:[id number]


Date of Sevice:  2019


 


ACCESSION #: [id number]


 


XR WRIST 3 OR MORE VIEWS LT, HAND LIMITED LEFT


 


COMPARISON: None.


 


HISTORY:  wrist pain , but the left wrist backwards


 


TECHNIQUE:  Two views left hand and three views left wrist


 


FINDINGS:


BONES:  No significant arthropathy, acute-appearing fracture, malalignment, or 


significant osseous lesion.  On the lateral view there is a 2 mm corticated bone


 fragment along the dorsal margin of what is probably the third CMC joint which 


has a chronic appearance.  Carpal alignment within normal limits.


 


SOFT TISSUES:  No appreciable soft tissue swelling.  No abnormal soft tissue 


calcifications.


 


OTHER:  Negative.


 


IMPRESSION:


Chronic appearing bone fragment along the dorsal margin of the third CMC joint. 


 This is probably due to remote trauma.  There is no acute fracture or 


malalignment in the left hand or wrist.


 


Report Dictated By: Marc Denney at 2019 5:03 PM


 


Report E-Signed By: Marc Denney  at 2019 5:05 PM


 


WSN:[id number]





ED Course/Re-evaluation


ED Course


Patient is a 28-year-old female here with complaints of left wrist pain after 


being kicked in the hand by a horse. Injury took place shortly prior to arrival.


 Patient is neurovascularly intact at time of evaluation in the distal 


extremity. X-ray imaging of the wrist and hand showed no acute fractures. 


Patient was given a preformed wrist splint for support. PCP follow-up 


recommended, return precautions provided.


Decision to Disposition Date:  Aug 7, 2019


Decision to Disposition Time:  17:13





Depart


Departure


Latest Vital Signs





Vital Signs








  Date Time  Temp Pulse Resp B/P (MAP) Pulse Ox O2 Delivery O2 Flow Rate FiO2


 


19 15:55 97.3 96 18 133/79 93 Room Air  








Impression:  


   Primary Impression:  


   Left wrist sprain


Condition:  Improved


Disposition:  HOME OR SELF-CARE


Referrals:  


WOLF BETH MD (PCP)


New Scripts


Tramadol Hcl (TRAMADOL HCL) 50 Mg Tablet


50 MG PO Q6H PRN for PAIN, #6 TAB 0 Refills


   Prov: HERMINIO ERAZO DO         19


Patient Instructions:  Wrist Sprain (ED)





Additional Instructions:  


No acute fractures identified on x-ray imaging. Please wear her splint for 


comfort, he may take ibuprofen or Tylenol as needed for primary pain control, 1 


tramadol every 6-8 hours as needed for breakthrough pain control. Please return 


promptly if you develop worsening pain, numbness.











HERMINIO ERAZO DO            Aug 7, 2019 15:53

## 2019-08-07 NOTE — RADIOLOGY IMAGING REPORT
FACILITY: [facility] 

 

PATIENT NAME: Ambre Tran

: 1990

MR: [id number]

V: [id number]

EXAM DATE: 

ORDERING PHYSICIAN: HERMINIO ERAZO

TECHNOLOGIST: 

 

[place] [facility] [place]

Patient: Amber Tran

: 1990

MRN: [id number]

Visit/Account:[id number]

Date of Sevice:  2019

 

ACCESSION #: [id number]

 

XR WRIST 3 OR MORE VIEWS LT, HAND LIMITED LEFT

 

COMPARISON: None.

 

HISTORY:  wrist pain , but the left wrist backwards

 

TECHNIQUE:  Two views left hand and three views left wrist

 

FINDINGS:

BONES:  No significant arthropathy, acute-appearing fracture, malalignment, or significant osseous le
hector.  On the lateral view there is a 2 mm corticated bone fragment along the dorsal margin of what i
s probably the third CMC joint which has a chronic appearance.  Carpal alignment within normal limits
.

 

SOFT TISSUES:  No appreciable soft tissue swelling.  No abnormal soft tissue calcifications.

 

OTHER:  Negative.

 

IMPRESSION:

Chronic appearing bone fragment along the dorsal margin of the third CMC joint.  This is probably due
 to remote trauma.  There is no acute fracture or malalignment in the left hand or wrist.

 

Report Dictated By: Marc Denney at 2019 5:03 PM

 

Report E-Signed By: Marc Denney  at 2019 5:05 PM

 

WSN:[id number]

## 2019-08-07 NOTE — RADIOLOGY IMAGING REPORT
FACILITY: [facility] 

 

PATIENT NAME: Amber Tran

: 1990

MR: [id number]

V: [id number]

EXAM DATE: 

ORDERING PHYSICIAN: HERMINIO ERAZO

TECHNOLOGIST: 

 

[place] [facility] [place]

Patient: Amber Tran

: 1990

MRN: [id number]

Visit/Account:[id number]

Date of Sevice:  2019

 

ACCESSION #: [id number]

 

XR WRIST 3 OR MORE VIEWS LT, HAND LIMITED LEFT

 

COMPARISON: None.

 

HISTORY:  wrist pain , but the left wrist backwards

 

TECHNIQUE:  Two views left hand and three views left wrist

 

FINDINGS:

BONES:  No significant arthropathy, acute-appearing fracture, malalignment, or significant osseous le
hector.  On the lateral view there is a 2 mm corticated bone fragment along the dorsal margin of what i
s probably the third CMC joint which has a chronic appearance.  Carpal alignment within normal limits
.

 

SOFT TISSUES:  No appreciable soft tissue swelling.  No abnormal soft tissue calcifications.

 

OTHER:  Negative.

 

IMPRESSION:

Chronic appearing bone fragment along the dorsal margin of the third CMC joint.  This is probably due
 to remote trauma.  There is no acute fracture or malalignment in the left hand or wrist.

 

Report Dictated By: Marc Denney at 2019 5:03 PM

 

Report E-Signed By: Marc Denney  at 2019 5:05 PM

 

WSN:[id number]
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
